# Patient Record
Sex: FEMALE | Race: WHITE
[De-identification: names, ages, dates, MRNs, and addresses within clinical notes are randomized per-mention and may not be internally consistent; named-entity substitution may affect disease eponyms.]

---

## 2020-07-30 ENCOUNTER — HOSPITAL ENCOUNTER (EMERGENCY)
Dept: HOSPITAL 8 - ED | Age: 65
Discharge: HOME | End: 2020-07-30
Payer: COMMERCIAL

## 2020-07-30 VITALS — BODY MASS INDEX: 29.73 KG/M2 | HEIGHT: 64 IN | WEIGHT: 174.17 LBS

## 2020-07-30 VITALS — SYSTOLIC BLOOD PRESSURE: 147 MMHG | DIASTOLIC BLOOD PRESSURE: 91 MMHG

## 2020-07-30 DIAGNOSIS — H53.149: ICD-10-CM

## 2020-07-30 DIAGNOSIS — G43.909: Primary | ICD-10-CM

## 2020-07-30 PROCEDURE — 99284 EMERGENCY DEPT VISIT MOD MDM: CPT

## 2020-07-30 PROCEDURE — 96374 THER/PROPH/DIAG INJ IV PUSH: CPT

## 2020-07-30 PROCEDURE — 96375 TX/PRO/DX INJ NEW DRUG ADDON: CPT

## 2020-07-30 NOTE — NUR
PT TO ROOM 20 W/ C/O HA X 2 DAYS. STATES WHEN IT FIRST STARTED SHE TOOK 
EXCEDRIN MIGRAINE W/O RELIEF. STATES IT NORMALLY HELPS W/ MIGRAINE HA. PT 
STATES IT FEELS SIMILAR TO OTHER HA'S SHE HAS HAD. PT RESTING ON GURNEY. NADN. 
MONITORS APPLIED. ERP DR. MCCULLOUGH AT BEDSIDE.